# Patient Record
Sex: FEMALE | Race: BLACK OR AFRICAN AMERICAN | NOT HISPANIC OR LATINO | URBAN - NONMETROPOLITAN AREA
[De-identification: names, ages, dates, MRNs, and addresses within clinical notes are randomized per-mention and may not be internally consistent; named-entity substitution may affect disease eponyms.]

---

## 2020-02-18 ENCOUNTER — IMPORTED ENCOUNTER (OUTPATIENT)
Dept: URBAN - NONMETROPOLITAN AREA CLINIC 1 | Facility: CLINIC | Age: 25
End: 2020-02-18

## 2020-02-18 PROBLEM — H52.13: Noted: 2020-02-18

## 2020-02-18 PROCEDURE — S0620 ROUTINE OPHTHALMOLOGICAL EXA: HCPCS

## 2020-08-10 ENCOUNTER — HOSPITAL ENCOUNTER (EMERGENCY)
Dept: HOSPITAL 62 - ER | Age: 25
LOS: 1 days | Discharge: HOME | End: 2020-08-11
Payer: SELF-PAY

## 2020-08-10 DIAGNOSIS — K08.89: ICD-10-CM

## 2020-08-10 DIAGNOSIS — S02.5XXA: ICD-10-CM

## 2020-08-10 DIAGNOSIS — K02.9: Primary | ICD-10-CM
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
## 2020-08-10 PROCEDURE — 99283 EMERGENCY DEPT VISIT LOW MDM: CPT

## 2020-08-11 VITALS — DIASTOLIC BLOOD PRESSURE: 87 MMHG | SYSTOLIC BLOOD PRESSURE: 139 MMHG

## 2020-08-11 NOTE — ER DOCUMENT REPORT
Entered by LIAT YEPEZ SCRIBE  08/11/20 0737 





Acting as scribe for:CURTIS BANKS MD





ED Oral Problem





- General


Chief Complaint: Toothache


Stated Complaint: TOOTH PAIN


Time Seen by Provider: 08/11/20 07:29


Mode of Arrival: Ambulatory


Information source: Patient


Notes: 





This 25 year old female patient presents to the emergency department today with 

complaints of two chipped teeth with associated pain which began one week ago. 

Patient indicates the bad teeth are in the upper left part of her mouth. 








- Related Data


Allergies/Adverse Reactions: 


                                        





No Known Allergies Allergy (Unverified 08/11/20 06:13)


   











Past Medical History





- General


Information source: Patient





- Social History


Smoking Status: Never Smoker


Cigarette use (# per day): No


Frequency of alcohol use: None


Drug Abuse: None


Lives with: Family


Family History: Reviewed & Not Pertinent


Patient has homicidal ideation: No





- Medical History


Medical History: Negative


Surgical Hx: Negative





Review of Systems





- Review of Systems


Constitutional: No symptoms reported


EENT: See HPI, Mouth pain, Mouth swelling, Dental problem


Cardiovascular: No symptoms reported


Respiratory: No symptoms reported


Gastrointestinal: No symptoms reported


Genitourinary: No symptoms reported


Female Genitourinary: See HPI, Last menstrual period - 8/4/20


Musculoskeletal: No symptoms reported


Skin: No symptoms reported


Hematologic/Lymphatic: No symptoms reported


Neurological/Psychological: No symptoms reported


-: Yes All other systems reviewed and negative





Physical Exam





- Vital signs


Vitals: 


                                        











Temp Pulse BP Pulse Ox


 


 98.8 F   95   189/118 H  99 


 


 08/10/20 23:06  08/10/20 23:06  08/10/20 23:06  08/10/20 23:06














- Notes


Notes: 





Physical Exam:


 


General: Alert, appears well. 


 


HEENT: Normocephalic. Atraumatic. PERRLA. Extraocular movements intact. 

Oropharynx clear. Left upper 1st molar posterior half is broken and decayed, no 

gum abnormalities. Left upper lateral incisor is broken and hollowed out with 

surrounding gum swelling. There are no abscesses. 


 


Neck: Supple. 


 


Respiratory: No respiratory distress. 


 


Abdominal: Morbidly obese. No distension. 


 


Extremities: Moves all four extremities.


 


Neurological: Normal cognition. AAOx4. Normal speech.  


 


Psychological: Normal affect. Normal Mood. 


 


Skin: Warm. Dry. Normal color.





Course





- Vital Signs


Vital signs: 


                                        











Temp Pulse Resp BP Pulse Ox


 


 98.0 F   97   16   139/87 H  98 


 


 08/11/20 07:54  08/11/20 07:54  08/10/20 23:08  08/11/20 07:54  08/11/20 07:54














Discharge





- Discharge


Clinical Impression: 


 Dental decay, Toothache





Condition: Stable


Disposition: HOME, SELF-CARE


Additional Instructions: 


Toothache





     Your pain is due to dental decay.  The tooth must be repaired in order for 

you to feel better.  You will, therefore, be referred to a dentist.


     Severe swelling or drainage around a tooth usually means a deep dental 

abscess.  This also requires evaluation and treatment by the dentist, but 

antibiotics may be prescribed while awaiting dental treatment.


     You should be rechecked immediately if you develop major swelling of the 

face, increasing pain, a lump in the jaw or gums, headache, or fever.








***

********************************************************************************


************************************





Take the medication as prescribed.


Try using Fix-A-Tooth to fill in the cavities in the teeth until you can see 

your dentist.


Follow-up with a dentist within the next week.





RETURN TO THE EMERGENCY ROOM IF ANY NEW OR WORSENING SYMPTOMS.








Prescriptions: 


Penicillin V Potassium [Penicillin Vk 500 mg Tablet] 500 mg PO QID #28 tablet





I personally performed the services described in the documentation, reviewed and

edited the documentation which was dictated to the scribe in my presence, and it

accurately records my words and actions.

## 2022-04-09 ASSESSMENT — VISUAL ACUITY
OD_CC: CF4'
OS_CC: CF4'